# Patient Record
Sex: FEMALE | Race: AMERICAN INDIAN OR ALASKA NATIVE
[De-identification: names, ages, dates, MRNs, and addresses within clinical notes are randomized per-mention and may not be internally consistent; named-entity substitution may affect disease eponyms.]

---

## 2019-06-20 ENCOUNTER — HOSPITAL ENCOUNTER (OUTPATIENT)
Dept: HOSPITAL 5 - XRAY | Age: 54
Discharge: HOME | End: 2019-06-20
Attending: PAIN MEDICINE
Payer: MEDICAID

## 2019-06-20 DIAGNOSIS — M19.011: ICD-10-CM

## 2019-06-20 DIAGNOSIS — M19.012: Primary | ICD-10-CM

## 2019-06-20 DIAGNOSIS — I10: ICD-10-CM

## 2019-06-20 DIAGNOSIS — J45.909: ICD-10-CM

## 2019-06-20 NOTE — XRAY REPORT
PROCEDURE: XR SHOULDER BILAT 2+V 

 

TECHNIQUE:  Frontal and Y views both shoulders 

 

HISTORY: PAIN IN BILATERAL SHOULDERS 

 

COMPARISONS: None 

 

FINDINGS: 

Right shoulder: 

There is no evidence of fracture or subluxation. 

There is evidence of degenerative change of the glenohumeral joint with osteophyte formation. 

The soft tissues are unremarkable. 

Left shoulder: 

There is no evidence of fracture or subluxation. 

There is degenerative change of the glenohumeral joint with osteophyte formation. 

The soft tissues are unremarkable. 

 

IMPRESSION: 

1. Evidence of degenerative change of the glenohumeral joints bilaterally. 

If further imaging is required, MRI may be helpful. 

 

This document is electronically signed by Jocelin Soriano MD., June 20 2019 08:19:15 PM ET

## 2020-06-07 ENCOUNTER — HOSPITAL ENCOUNTER (OUTPATIENT)
Dept: HOSPITAL 5 - ED | Age: 55
Setting detail: OBSERVATION
LOS: 2 days | Discharge: HOME | End: 2020-06-09
Attending: INTERNAL MEDICINE | Admitting: INTERNAL MEDICINE
Payer: MEDICAID

## 2020-06-07 DIAGNOSIS — Z88.6: ICD-10-CM

## 2020-06-07 DIAGNOSIS — F17.200: ICD-10-CM

## 2020-06-07 DIAGNOSIS — I10: ICD-10-CM
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
## 2020-06-07 DIAGNOSIS — E87.6: ICD-10-CM

## 2020-06-07 DIAGNOSIS — M19.019: ICD-10-CM
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
## 2020-06-07 DIAGNOSIS — R41.82: ICD-10-CM

## 2020-06-07 DIAGNOSIS — G92: ICD-10-CM

## 2020-06-07 DIAGNOSIS — F31.9: ICD-10-CM

## 2020-06-07 DIAGNOSIS — M06.9: ICD-10-CM

## 2020-06-07 DIAGNOSIS — Z90.710: ICD-10-CM

## 2020-06-07 DIAGNOSIS — Z88.1: ICD-10-CM

## 2020-06-07 DIAGNOSIS — E66.9: ICD-10-CM
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
## 2020-06-07 DIAGNOSIS — Z79.899: ICD-10-CM

## 2020-06-07 DIAGNOSIS — E11.9: ICD-10-CM

## 2020-06-07 DIAGNOSIS — F41.1: ICD-10-CM

## 2020-06-07 DIAGNOSIS — T40.2X1A: Primary | ICD-10-CM

## 2020-06-07 LAB
ALBUMIN SERPL-MCNC: 3.3 G/DL (ref 3.9–5)
ALT SERPL-CCNC: 15 UNITS/L (ref 7–56)
BASOPHILS # (AUTO): 0.1 K/MM3 (ref 0–0.1)
BASOPHILS NFR BLD AUTO: 0.7 % (ref 0–1.8)
BUN SERPL-MCNC: 9 MG/DL (ref 7–17)
BUN/CREAT SERPL: 13 %
CALCIUM SERPL-MCNC: 9.1 MG/DL (ref 8.4–10.2)
EOSINOPHIL # BLD AUTO: 0.2 K/MM3 (ref 0–0.4)
EOSINOPHIL NFR BLD AUTO: 1.9 % (ref 0–4.3)
HCO3 BLDA-SCNC: 25.6 MMOL/L (ref 20–26)
HCT VFR BLD CALC: 32.1 % (ref 30.3–42.9)
HEMOLYSIS INDEX: 16
HGB BLD-MCNC: 11 GM/DL (ref 10.1–14.3)
LYMPHOCYTES # BLD AUTO: 2.1 K/MM3 (ref 1.2–5.4)
LYMPHOCYTES NFR BLD AUTO: 21.9 % (ref 13.4–35)
MCHC RBC AUTO-ENTMCNC: 34 % (ref 30–34)
MCV RBC AUTO: 88 FL (ref 79–97)
MONOCYTES # (AUTO): 1.1 K/MM3 (ref 0–0.8)
MONOCYTES % (AUTO): 11.7 % (ref 0–7.3)
PCO2 BLDA: 39.3 MM HG
PH BLDA: 7.43 PH UNITS (ref 7.35–7.45)
PLATELET # BLD: 284 K/MM3 (ref 140–440)
PO2 BLDA: 91.6 MM HG (ref 80–90)
RBC # BLD AUTO: 3.66 M/MM3 (ref 3.65–5.03)

## 2020-06-07 PROCEDURE — 93005 ELECTROCARDIOGRAM TRACING: CPT

## 2020-06-07 PROCEDURE — 71045 X-RAY EXAM CHEST 1 VIEW: CPT

## 2020-06-07 PROCEDURE — 96365 THER/PROPH/DIAG IV INF INIT: CPT

## 2020-06-07 PROCEDURE — G0378 HOSPITAL OBSERVATION PER HR: HCPCS

## 2020-06-07 PROCEDURE — G0480 DRUG TEST DEF 1-7 CLASSES: HCPCS

## 2020-06-07 PROCEDURE — 80307 DRUG TEST PRSMV CHEM ANLYZR: CPT

## 2020-06-07 PROCEDURE — 99291 CRITICAL CARE FIRST HOUR: CPT

## 2020-06-07 PROCEDURE — 85025 COMPLETE CBC W/AUTO DIFF WBC: CPT

## 2020-06-07 PROCEDURE — 82962 GLUCOSE BLOOD TEST: CPT

## 2020-06-07 PROCEDURE — 96372 THER/PROPH/DIAG INJ SC/IM: CPT

## 2020-06-07 PROCEDURE — 36415 COLL VENOUS BLD VENIPUNCTURE: CPT

## 2020-06-07 PROCEDURE — 80053 COMPREHEN METABOLIC PANEL: CPT

## 2020-06-07 PROCEDURE — 96361 HYDRATE IV INFUSION ADD-ON: CPT

## 2020-06-07 PROCEDURE — 80048 BASIC METABOLIC PNL TOTAL CA: CPT

## 2020-06-07 PROCEDURE — 85610 PROTHROMBIN TIME: CPT

## 2020-06-07 PROCEDURE — 82803 BLOOD GASES ANY COMBINATION: CPT

## 2020-06-07 PROCEDURE — 96375 TX/PRO/DX INJ NEW DRUG ADDON: CPT

## 2020-06-07 PROCEDURE — 80320 DRUG SCREEN QUANTALCOHOLS: CPT

## 2020-06-07 PROCEDURE — 81001 URINALYSIS AUTO W/SCOPE: CPT

## 2020-06-07 NOTE — EMERGENCY DEPARTMENT REPORT
History of Present Illness





- General


Stated Complaint: AMS


Time Seen by Provider: 20 22:00


Source: patient


Mode of arrival: Stretcher


Limitations: Altered Mental Status





- History of Present Illness


Initial Comments: 





Patient is a 55-year-old female that presents emergency room with complaints of 

accidental overdose.  Patient brought in by EMS.  EMS gave the patient Narcan 

prior to arrival.  Patient's initial assessment by EMS was that the patient had 

pinpoint pupils and was unresponsive.  Patient became more arousable after 

Narcan.  Patient answering questions.  Patient states that she is tired.  

Patient states that she was trying to get rid of her shoulder pain.  Patient 

states she has sublingual buprenorphine and she took extra because the pain was 

so severe.  Patient states she is awaiting to have a shoulder surgery.  Patient 

denies suicidal or homicidal ideations.  Patient denies depression.


MD Complaint: accidental overdose


-: Sudden


Context: Accidental Overdose: medication error


Treatments Prior to Arrival: narcan





- Related Data


                                Home Medications











 Medication  Instructions  Recorded  Confirmed  Last Taken


 


Buprenorphine HCl [Belbuca] 600 mcg BC Q12H 20 Unknown


 


DULoxetine [Cymbalta] 30 mg PO DAILY 20 Unknown


 


Dicyclomine [Bentyl] 20 mg PO QHS 20 Unknown


 


Penicillin Vk [Veetids TAB] 500 mg PO QID 20 Unknown


 


QUEtiapine [SEROquel] 200 mg PO QHS 20 Unknown


 


amLODIPine [Norvasc] 5 mg PO DAILY 20 Unknown


 


carBAMazepine [Carbamazepine] 200 mg PO QAM 20 Unknown


 


carBAMazepine [Carbamazepine] 400 mg PO QHS 20 Unknown


 


clonazePAM [Klonopin] 1 mg PO DAILY 20 Unknown











                                    Allergies











Allergy/AdvReac Type Severity Reaction Status Date / Time


 


ketorolac tromethamine Allergy  Rash Verified 05/28/15 14:53





[From Toradol]     


 


levofloxacin [From Levaquin] Allergy  Rash Verified 05/28/15 14:53














ED Review of Systems


ROS: 


Stated complaint: AMS


Other details as noted in HPI





Constitutional: denies: chills, fever


Eyes: denies: eye pain, eye discharge, vision change


ENT: denies: ear pain, throat pain


Respiratory: denies: cough, shortness of breath, wheezing


Cardiovascular: denies: chest pain, palpitations


Endocrine: no symptoms reported


Gastrointestinal: denies: abdominal pain, nausea, diarrhea


Genitourinary: denies: urgency, dysuria, discharge


Musculoskeletal: denies: back pain, joint swelling, arthralgia


Skin: denies: rash, lesions


Neurological: denies: headache, weakness, paresthesias


Psychiatric: denies: anxiety, depression


Hematological/Lymphatic: denies: easy bleeding, easy bruising





ED Past Medical Hx





- Past Medical History


Previous Medical History?: Yes


Hx Hypertension: Yes


Hx Diabetes: Yes


Hx Psychiatric Treatment: Yes (2 IP txs)


Hx Asthma: Yes (RA)


Additional medical history: unknown??





- Surgical History


Additional Surgical History: hysterectomy





- Family History


Family history: no significant





- Social History


Smoking Status: Current Every Day Smoker


Substance Use Type: None





- Medications


Home Medications: 


                                Home Medications











 Medication  Instructions  Recorded  Confirmed  Last Taken  Type


 


Buprenorphine HCl [Belbuca] 600 mcg BC Q12H 20 Unknown History


 


DULoxetine [Cymbalta] 30 mg PO DAILY 20 Unknown History


 


Dicyclomine [Bentyl] 20 mg PO QHS 20 Unknown History


 


Penicillin Vk [Veetids TAB] 500 mg PO QID 20 Unknown History


 


QUEtiapine [SEROquel] 200 mg PO QHS 20 Unknown History


 


amLODIPine [Norvasc] 5 mg PO DAILY 20 Unknown History


 


carBAMazepine [Carbamazepine] 200 mg PO QAM 20 Unknown History


 


carBAMazepine [Carbamazepine] 400 mg PO QHS 20 Unknown History


 


clonazePAM [Klonopin] 1 mg PO DAILY 20 Unknown History














ED Physical Exam





- General


General appearance: alert, in no apparent distress





- Head


Head exam: Present: atraumatic, normocephalic





- Eye


Eye exam: Present: normal appearance





- ENT


ENT exam: Present: mucous membranes moist





- Neck


Neck exam: Present: normal inspection





- Respiratory


Respiratory exam: Present: normal lung sounds bilaterally.  Absent: respiratory 

distress





- Cardiovascular


Cardiovascular Exam: Present: regular rate, normal rhythm.  Absent: systolic 

murmur, diastolic murmur, rubs, gallop





- GI/Abdominal


GI/Abdominal exam: Present: soft, normal bowel sounds





- Extremities Exam


Extremities exam: Present: normal inspection





- Back Exam


Back exam: Present: normal inspection





- Neurological Exam


Neurological exam: Present: alert, oriented X3





- Psychiatric


Psychiatric exam: Present: normal affect, normal mood





- Skin


Skin exam: Present: warm, dry, intact, normal color.  Absent: rash





ED Course


                                   Vital Signs











  20





  22:24 23:30 00:00


 


Temperature 98.2 F  


 


Pulse Rate 94 H 90 


 


Respiratory 20 20 20





Rate   


 


Blood Pressure 151/86 161/98 





[Left]   


 


O2 Sat by Pulse 84 99 95





Oximetry   














- Reevaluation(s)


Reevaluation #1: 


Initial evaluation done.  Patient found to be lethargic but arousable.  Patient 

answering questions appropriately.  Patient will be monitored.  Nurse to call 

poison control.


20 22:10





Reevaluation #2: 


Recommendation received from poison control.  See recommendations below placed 

on nurses note.


20 23:00





JENNAGEMMA MALGORZATA   Female : 1965  MedRec# [id number]





20 22:56 - Nurse Note by HUGH BROWN


   [id number]  : 1965  Patient Age: 55





2220-Spoke with poison control and they advise to give Narcan prn and watch for 

respiratory depression and Acute Lung Injury, Hypotension, Bradycardia, and 

Myosis. Order chest xray, electrolytes, ABG, Tylenol level, Aspirin level, drug 

screen, and alcohol level. If Narcan has to repeated may consider placing 

patient on a Narcan drip. Patient should be monitored ICU 24 hours and until 

patient has returned to baseline and no Narcan given for 6 hours.     





Initialized on 20 22:56 - END OF NOTE











Reevaluation #3: 


Patient is lethargic but easily arousable.  Patient answering questions ashutosh

ropriately.  Patient's O2 saturations stable.


20 23:30





Reevaluation #4: 


Patient still lethargic but arousable.


20 00:01





Reevaluation #5: 


I discussed all results with patient.  I discussed plan of care with patient.  

Patient agrees with plan of care and admission.  Patient to be admitted to the 

hospitalist service.


20 00:45








- Consultations


Consultation #1: 


Hospitalist consulted for admission.  Hospitalist to admit patient.


20 00:45








ED Medical Decision Making





- Lab Data


Result diagrams: 


                                 20 22:49





                                 20 22:49





- EKG Data


-: EKG Interpreted by Me


EKG shows normal: sinus rhythm, axis, intervals, QRS complexes, ST-T waves


Rate: normal





- Medical Decision Making





Patient is a 55-year-old female that presents emergency room with complaints of 

accidental overdose with buprenorphine.  Patient has buccal films and and per 

the patient prescription the patient picked up the prescription 7 days ago and 

takes 1 film twice a day.  Patient has only 7 left of a prescription that she 

obtained 7 days ago of 60 films.  Patient found to be lethargic but easily 

arousable and answering questions.  Patient given Narcan by EMS prior to 

arrival.  EMS states the patient was completely unresponsive with pinpoint 

pupils until they gave Narcan and the patient became arousable.  Poison control 

was consulted early on in the patient's ER stay.  Patient had labs done patient 

labs essentially unremarkable except for the patient's UDS.  Patient's admitted 

to the ICU per the recommendations of poison control.  Patient admitted to the 

hospitalist service.  





- Differential Diagnosis


Accidental overdose, lethargy, altered mental status, decreased responsiven


Critical Care Time: Yes


Critical care time in (mins) excluding proc time.: 35


Critical care attestation.: 


If time is entered above; I have spent that time in minutes in the direct care 

of this critically ill patient, excluding procedure time.





Critical Care Time: 





35 minutes





ED Disposition


Clinical Impression: 


 Hypokalemia, Decreased responsiveness





Overdose


Qualifiers:


 Encounter type: initial encounter Injury intent: accidental or unintentional 

Qualified Code(s): T50.901A - Poisoning by unspecified drugs, medicaments and 

biological substances, accidental (unintentional), initial encounter





Altered mental state


Qualifiers:


 Altered mental status type: unspecified Qualified Code(s): R41.82 - Altered 

mental status, unspecified





Disposition: -09 OP ADMIT IP TO THIS HOSP


Is pt being admited?: Yes


Does the pt Need Aspirin: No


Condition: Critical


Referrals: 


PRIMARY CARE,MD [Primary Care Provider] - 3-5 Days


Time of Disposition: 00:33

## 2020-06-08 LAB
BACTERIA #/AREA URNS HPF: (no result) /HPF
BENZODIAZEPINES SCREEN,URINE: (no result)
BILIRUB UR QL STRIP: (no result)
BLOOD UR QL VISUAL: (no result)
METHADONE SCREEN,URINE: (no result)
MUCOUS THREADS #/AREA URNS HPF: (no result) /HPF
OPIATE SCREEN,URINE: (no result)
PH UR STRIP: 6 [PH] (ref 5–7)
PROT UR STRIP-MCNC: (no result) MG/DL
RBC #/AREA URNS HPF: 10 /HPF (ref 0–6)
UROBILINOGEN UR-MCNC: < 2 MG/DL (ref ?–2)
WBC #/AREA URNS HPF: 1 /HPF (ref 0–6)

## 2020-06-08 RX ADMIN — OXYCODONE AND ACETAMINOPHEN PRN TAB: 5; 325 TABLET ORAL at 17:08

## 2020-06-08 RX ADMIN — HEPARIN SODIUM SCH: 5000 INJECTION, SOLUTION INTRAVENOUS; SUBCUTANEOUS at 21:44

## 2020-06-08 RX ADMIN — HEPARIN SODIUM SCH UNIT: 5000 INJECTION, SOLUTION INTRAVENOUS; SUBCUTANEOUS at 07:02

## 2020-06-08 RX ADMIN — INSULIN LISPRO SCH: 100 INJECTION, SOLUTION INTRAVENOUS; SUBCUTANEOUS at 09:12

## 2020-06-08 RX ADMIN — OXYCODONE AND ACETAMINOPHEN PRN TAB: 5; 325 TABLET ORAL at 23:06

## 2020-06-08 RX ADMIN — INSULIN LISPRO SCH: 100 INJECTION, SOLUTION INTRAVENOUS; SUBCUTANEOUS at 11:40

## 2020-06-08 RX ADMIN — HEPARIN SODIUM SCH UNIT: 5000 INJECTION, SOLUTION INTRAVENOUS; SUBCUTANEOUS at 13:05

## 2020-06-08 NOTE — PROGRESS NOTE
Assessment and Plan


Assessment and plan: 


--Accidental drug overdose;


Patient took pain medication accidentally


Denies suicidal ideation or suicidal thoughts


Status post Narcan, patient is alert awake oriented x3





--Acute toxic metabolic encephalopathy;


Secondary to drug overdose


Patient is more alert and awake responding appropriately


Closely monitor





--Depression/psych illness;


Patient on psych medications


Psych consult to evaluate the patient


And review patient's medications





--Shoulder pain/arthritis


Hold narcotic pain medications


Toradol as needed and supportive care





--Obesity; BMI 34.4;


Patient needs to reduce weight when medically stable


Advised diet modification exercise as tolerated





--Hypokalemia; replenish per protocol and monitor levels


Check magnesium





--Hypertension; moderate control


Add oral hydralazine and PRN IV hydralazine


Closely monitor





--DVT prophylaxis;


Lovenox





--Full CODE STATUS





Closely monitor the patient and adjust the management as needed


Plan of care reviewed with the patient and her nurse


Follow psych evaluation and recommendations


Possible discharge in 1 to 2 days if stable





Advance care 35 minutes































































































History


Interval history: 


Patient seen and examined at the bedside, patient's chart and med medications


Tests and reports reviewed


Patient was admitted with accidental drug overdose altered level of 

consciousness


Patient is more alert and awake today, says she is depressed of her shoulder 

pain


But denied suicidal thoughts, suicidal ideation or suicidal attempt


Vital signs reviewed





Hospitalist Physical





- Constitutional


Vitals: 


                                        











Temp Pulse Resp BP Pulse Ox


 


 98.4 F   72   17   135/85   100 


 


 06/08/20 09:15  06/08/20 10:16  06/08/20 08:00  06/08/20 10:16  06/08/20 08:00











General appearance: Present: mild distress, well-nourished, obese





- EENT


Eyes: Present: PERRL, EOM intact





- Neck


Neck: Present: supple, normal ROM





- Respiratory


Respiratory effort: normal


Respiratory: bilateral: diminished, negative: rales, rhonchi, wheezing





- Cardiovascular


Rhythm: regular


Heart Sounds: Present: S1 & S2





- Extremities


Extremities: no ischemia, No edema





- Abdominal


General gastrointestinal: soft, non-tender, non-distended, normal bowel sounds





- Integumentary


Integumentary: Present: clear, warm





- Psychiatric


Psychiatric: appropriate mood/affect, cooperative





- Neurologic


Neurologic: moves all extremities





Results





- Labs


CBC & Chem 7: 


                                 06/07/20 22:49





                                 06/07/20 22:49


Labs: 


                             Laboratory Last Values











WBC  9.5 K/mm3 (4.5-11.0)   06/07/20  22:49    


 


RBC  3.66 M/mm3 (3.65-5.03)   06/07/20  22:49    


 


Hgb  11.0 gm/dl (10.1-14.3)   06/07/20  22:49    


 


Hct  32.1 % (30.3-42.9)   06/07/20  22:49    


 


MCV  88 fl (79-97)   06/07/20  22:49    


 


MCH  30 pg (28-32)   06/07/20  22:49    


 


MCHC  34 % (30-34)   06/07/20  22:49    


 


RDW  14.6 % (13.2-15.2)   06/07/20  22:49    


 


Plt Count  284 K/mm3 (140-440)   06/07/20  22:49    


 


Lymph % (Auto)  21.9 % (13.4-35.0)   06/07/20  22:49    


 


Mono % (Auto)  11.7 % (0.0-7.3)  H  06/07/20  22:49    


 


Eos % (Auto)  1.9 % (0.0-4.3)   06/07/20  22:49    


 


Baso % (Auto)  0.7 % (0.0-1.8)   06/07/20  22:49    


 


Lymph #  2.1 K/mm3 (1.2-5.4)   06/07/20  22:49    


 


Mono #  1.1 K/mm3 (0.0-0.8)  H  06/07/20  22:49    


 


Eos #  0.2 K/mm3 (0.0-0.4)   06/07/20  22:49    


 


Baso #  0.1 K/mm3 (0.0-0.1)   06/07/20  22:49    


 


Seg Neutrophils %  63.8 % (40.0-70.0)   06/07/20  22:49    


 


Seg Neutrophils #  6.0 K/mm3 (1.8-7.7)   06/07/20  22:49    


 


ABG pH  7.432 pH Units (7.350-7.450)   06/07/20  23:23    


 


ABG pCO2  39.3 mm Hg  06/07/20  23:23    


 


ABG pO2  91.6 mm Hg (80.0-90.0)  H  06/07/20  23:23    


 


ABG HCO3  25.6 mmol/L (20.0-26.0)   06/07/20  23:23    


 


ABG O2 Saturation  97.3 % (95.0-99.0)   06/07/20  23:23    


 


ABG O2 Content  13.5  (0.0-44)   06/07/20  23:23    


 


ABG Base Excess  1.3 mmol/L (-2.0-3.0)   06/07/20  23:23    


 


ABG Hemoglobin  10.0 gm/dl (12.0-16.0)  L  06/07/20  23:23    


 


ABG Carboxyhemoglobin  1.9 % (0.0-5.0)   06/07/20  23:23    


 


ABG Methemoglobin  0.7 % (0.0-1.5)   06/07/20  23:23    


 


Oxyhemoglobin  94.9 % (95.0-99.0)  L  06/07/20  23:23    


 


FiO2  28 %  06/07/20  23:23    


 


Sodium  141 mmol/L (137-145)   06/07/20  22:49    


 


Potassium  3.2 mmol/L (3.6-5.0)  L  06/07/20  22:49    


 


Chloride  103.0 mmol/L ()   06/07/20  22:49    


 


Carbon Dioxide  23 mmol/L (22-30)   06/07/20  22:49    


 


Anion Gap  18 mmol/L  06/07/20  22:49    


 


BUN  9 mg/dL (7-17)   06/07/20  22:49    


 


Creatinine  0.7 mg/dL (0.7-1.2)   06/07/20  22:49    


 


Estimated GFR  > 60 ml/min  06/07/20  22:49    


 


BUN/Creatinine Ratio  13 %  06/07/20  22:49    


 


Glucose  144 mg/dL ()  H  06/07/20  22:49    


 


Calcium  9.1 mg/dL (8.4-10.2)   06/07/20  22:49    


 


Total Bilirubin  0.30 mg/dL (0.1-1.2)   06/07/20  22:49    


 


AST  13 units/L (5-40)   06/07/20  22:49    


 


ALT  15 units/L (7-56)   06/07/20  22:49    


 


Alkaline Phosphatase  115 units/L ()   06/07/20  22:49    


 


Total Protein  7.2 g/dL (6.3-8.2)   06/07/20  22:49    


 


Albumin  3.3 g/dL (3.9-5)  L  06/07/20  22:49    


 


Albumin/Globulin Ratio  0.8 %  06/07/20  22:49    


 


Urine Color  Yellow  (Yellow)   06/07/20  23:53    


 


Urine Turbidity  Clear  (Clear)   06/07/20  23:53    


 


Urine pH  6.0  (5.0-7.0)   06/07/20  23:53    


 


Ur Specific Gravity  1.015  (1.003-1.030)   06/07/20  23:53    


 


Urine Protein  <15 mg/dl mg/dL (Negative)   06/07/20  23:53    


 


Urine Glucose (UA)  Neg mg/dL (Negative)   06/07/20  23:53    


 


Urine Ketones  Neg mg/dL (Negative)   06/07/20  23:53    


 


Urine Blood  Sm  (Negative)   06/07/20  23:53    


 


Urine Nitrite  Neg  (Negative)   06/07/20  23:53    


 


Urine Bilirubin  Neg  (Negative)   06/07/20  23:53    


 


Urine Urobilinogen  < 2.0 mg/dL (<2.0)   06/07/20  23:53    


 


Ur Leukocyte Esterase  Neg  (Negative)   06/07/20  23:53    


 


Urine WBC (Auto)  1.0 /HPF (0.0-6.0)   06/07/20  23:53    


 


Urine RBC (Auto)  10.0 /HPF (0.0-6.0)   06/07/20  23:53    


 


U Epithel Cells (Auto)  1.0 /HPF (0-13.0)   06/07/20  23:53    


 


Urine Bacteria (Auto)  1+ /HPF (Negative)   06/07/20  23:53    


 


Urine Mucus  Few /HPF  06/07/20  23:53    


 


Salicylates  < 0.3 mg/dL (2.8-20.0)  L  06/07/20  22:49    


 


Urine Opiates Screen  Presumptive negative   06/07/20  23:53    


 


Urine Methadone Screen  Presumptive negative   06/07/20  23:53    


 


Acetaminophen  < 5.0 ug/mL (10.0-30.0)  L  06/07/20  22:49    


 


Ur Barbiturates Screen  Presumptive positive   06/07/20  23:53    


 


Ur Phencyclidine Scrn  Presumptive negative   06/07/20  23:53    


 


Ur Amphetamines Screen  Presumptive negative   06/07/20  23:53    


 


U Benzodiazepines Scrn  Presumptive positive   06/07/20  23:53    


 


Urine Cocaine Screen  Presumptive negative   06/07/20  23:53    


 


U Marijuana (THC) Screen  Presumptive negative   06/07/20  23:53    


 


Drugs of Abuse Note  Disclamer   06/07/20  23:53    


 


Plasma/Serum Alcohol  < 0.01 % (0-0.07)   06/07/20  22:49    











Trejo/IV: 


                                        





IV Catheter Type [Right          INT / Saline Lock


Antecubital]                     











Active Medications





- Current Medications


Current Medications: 














Generic Name Dose Route Start Last Admin





  Trade Name Freq  PRN Reason Stop Dose Admin


 


Amlodipine Besylate  5 mg  06/08/20 10:00  06/08/20 10:16





  Amlodipine  PO   5 mg





  DAILY JENNIFER   Administration


 


Dextrose  50 ml  06/08/20 01:04 





  D50w (25gm) Syringe  IV  





  Q30MIN PRN  





  Hypoglycemia  





  Protocol  


 


Heparin Sodium (Porcine)  5,000 unit  06/08/20 06:00  06/08/20 07:02





  Heparin  SUB-Q   5,000 unit





  Q8HR JENNIFER   Administration


 


Hydralazine HCl  10 mg  06/08/20 01:16 





  Apresoline  IV  





  Q6HR PRN  





  Blood Pressure  


 


Sodium Chloride  1,000 mls @ 250 mls/hr  06/08/20 01:45 





  Nacl 0.9% 1000 Ml  IV  





  AS DIRECT JENNIFER  


 


Insulin Human Lispro  0 unit  06/08/20 07:30  06/08/20 09:12





  Humalog  SUB-Q   Not Given





  [facility]  





  Protocol  


 


Magnesium Hydroxide  30 ml  06/08/20 01:00 





  Milk Of Magnesia  PO  





  Q4H PRN  





  Constipation  


 


Naloxone HCl  0.1 mg  06/07/20 22:09  06/07/20 22:50





  Naloxone  IV   0.1 mg





  Q2MIN PRN   Administration





  Res Rate </= 8 or 02 SAT < 92%  


 


Ondansetron HCl  4 mg  06/08/20 01:00 





  Zofran  IV  





  Q8H PRN  





  Nausea And Vomiting  


 


Quetiapine Fumarate  200 mg  06/08/20 22:00 





  Seroquel  PO  





  [facility] JENNIFER  


 


Sodium Chloride  10 ml  06/08/20 10:00  06/08/20 10:17





  Sodium Chloride Flush Syringe 10 Ml  IV   10 ml





  BID JENNIFER   Administration


 


Sodium Chloride  10 ml  06/08/20 01:00 





  Sodium Chloride Flush Syringe 10 Ml  IV  





  PRN PRN  





  LINE FLUSH  














Nutrition/Malnutrition Assess





- Dietary Evaluation


Nutrition/Malnutrition Findings: 


                                        





Nutrition Notes                                            Start:  06/08/20 

11:00


Freq:                                                      Status: Active       




Protocol:                                                                       




 Document     06/08/20 11:00  LP  (Rec: 06/08/20 11:02  LP  [id number])


 Nutrition Notes


     Need for Assessment generated from:         MD Order


     Initial or Follow up                        Brief Note


     Current Diagnosis                           Diabetes,Hypertension


     Other Pertinent Diagnosis                   Asthma, OD


     Current Diet                                Cardiac/consistent CHO


     Weight Status                               Obese


     Subjective/Other Information                Consult for diet education. Pt


                                                 sleeping at time of visit.


 Nutrition Intervention


     Follow-Up By:                               06/09/20


     Additional Comments                         Follow for diet education

## 2020-06-08 NOTE — HISTORY AND PHYSICAL REPORT
History of Present Illness


Date of examination: 06/08/20


Date of admission: 


06/08/2020


Chief complaint: 





Accidental overdose


History of present illness: 





57-year-old -American female brought in by EMS to the emergency room 

today with complaints of accidental overdose.  Initial assessment by EMS was 

that patient had pinpoint pupils and was unresponsive.  She was given some 

Narcan prior to arrival in the emergency room.  She became more responsive and 

was answering questions.  She indicates that she is just tired.  She was 

prescribed some sublingual buprenorphine for shoulder pain and admits that she 

took extra doses in order to take care of the shoulder pain.  She currently 

awaits shoulder surgery.





She denies homicidal or suicidal ideations.  She denies having depression.





Her work-up in the emergency room was unremarkable except for mild hypokalemia. 

However recommendation by Poison control is to monitor patient in the intensive 

care unit.  Other recommendations were to give Narcan as needed.





Past History


Past Medical History: diabetes, hypertension, other (Rheumatoid arthritis)


Past Surgical History: hysterectomy


Social history: smoking (Current every day smoker).  denies: alcohol abuse


Family history: no significant family history





Medications and Allergies


                                    Allergies











Allergy/AdvReac Type Severity Reaction Status Date / Time


 


ketorolac tromethamine Allergy  Rash Verified 05/28/15 14:53





[From Toradol]     


 


levofloxacin [From Levaquin] Allergy  Rash Verified 05/28/15 14:53











                                Home Medications











 Medication  Instructions  Recorded  Confirmed  Last Taken  Type


 


Buprenorphine HCl [Belbuca] 600 mcg BC Q12H 06/07/20 06/07/20 Unknown History


 


DULoxetine [Cymbalta] 30 mg PO DAILY 06/07/20 06/07/20 Unknown History


 


Dicyclomine [Bentyl] 20 mg PO QHS 06/07/20 06/07/20 Unknown History


 


Penicillin Vk [Veetids TAB] 500 mg PO QID 06/07/20 06/07/20 Unknown History


 


QUEtiapine [SEROquel] 200 mg PO QHS 06/07/20 06/07/20 Unknown History


 


amLODIPine [Norvasc] 5 mg PO DAILY 06/07/20 06/07/20 Unknown History


 


carBAMazepine [Carbamazepine] 200 mg PO QAM 06/07/20 06/07/20 Unknown History


 


carBAMazepine [Carbamazepine] 400 mg PO QHS 06/07/20 06/07/20 Unknown History


 


clonazePAM [Klonopin] 1 mg PO DAILY 06/07/20 06/07/20 Unknown History











Active Meds: 


Active Medications





Dextrose (D50w (25gm) Syringe)  50 ml IV Q30MIN PRN; Protocol


   PRN Reason: Hypoglycemia


Potassium Chloride (Kcl 10meq/100ml)  10 meq in 100 mls @ 100 mls/hr IV ONCE ONE


   Stop: 06/08/20 02:04


Insulin Human Lispro (Humalog)  0 unit SUB-Q ACHS [facility]ENN[facility]; Protocol


Magnesium Hydroxide (Milk Of Magnesia)  30 ml PO Q4H PRN


   PRN Reason: Constipation


Naloxone HCl (Naloxone)  0.1 mg IV Q2MIN PRN


   PRN Reason: Res Rate </= 8 or 02 SAT < 92%


   Last Admin: 06/07/20 22:50 Dose:  0.1 mg


   Documented by: 


Ondansetron HCl (Zofran)  4 mg IV Q8H PRN


   PRN Reason: Nausea And Vomiting


Sodium Chloride (Sodium Chloride Flush Syringe 10 Ml)  10 ml IV BID [facility]ENN[facility]


Sodium Chloride (Sodium Chloride Flush Syringe 10 Ml)  10 ml IV PRN PRN


   PRN Reason: LINE FLUSH











Review of Systems


Constitutional: no fever, no chills, no fatigue


Ears, nose, mouth and throat: no nasal congestion, no sore throat


Cardiovascular: no chest pain, no palpitations


Respiratory: no cough, no cough with sputum, no shortness of breath


Gastrointestinal: no abdominal pain, no nausea, no vomiting, no diarrhea


Genitourinary Female: no flank pain, no dysuria, no urinary frequency, no 

hematuria


Rectal: no pain, no itching


Musculoskeletal: no neck pain, no low back pain


Integumentary: no rash, no pruritis


Neurological: no headaches, no confusion


Psychiatric: no anxiety


Endocrine: no cold intolerance, no excessive thirst, no polydipsia, no polyuria,

 no nocturia





Exam





- Constitutional


Vitals: 


                                        











Temp Pulse Resp BP Pulse Ox


 


 98.2 F   84   20   144/83   100 


 


 06/07/20 22:24  06/08/20 00:45  06/08/20 00:45  06/08/20 00:45  06/08/20 00:45











General appearance: Present: no acute distress, well-nourished, obese





- EENT


Eyes: Present: PERRL, EOM intact


ENT: hearing intact, clear oral mucosa, dentition normal





- Neck


Neck: Present: supple, normal ROM





- Respiratory


Respiratory effort: normal


Respiratory: bilateral: CTA





- Cardiovascular


Rhythm: regular


Heart Sounds: Present: S1 & S2





- Extremities


Extremities: no ischemia, pulses intact, pulses symmetrical, No edema, Full ROM





- Abdominal


General gastrointestinal: Present: soft, non-tender, non-distended, normal bowel

 sounds





Results





- Labs


CBC & Chem 7: 


                                 06/07/20 22:49





                                 06/07/20 22:49


Labs: 


                              Abnormal lab results











  06/07/20 06/07/20 06/07/20 Range/Units





  22:49 22:49 22:49 


 


Mono % (Auto)  11.7 H    (0.0-7.3)  %


 


Mono #  1.1 H    (0.0-0.8)  K/mm3


 


ABG pO2     (80.0-90.0)  mm Hg


 


ABG Hemoglobin     (12.0-16.0)  gm/dl


 


Oxyhemoglobin     (95.0-99.0)  %


 


Potassium   3.2 L   (3.6-5.0)  mmol/L


 


Glucose   144 H   ()  mg/dL


 


Albumin   3.3 L   (3.9-5)  g/dL


 


Salicylates    < 0.3 L  (2.8-20.0)  mg/dL


 


Acetaminophen     (10.0-30.0)  ug/mL














  06/07/20 06/07/20 Range/Units





  22:49 23:23 


 


Mono % (Auto)    (0.0-7.3)  %


 


Mono #    (0.0-0.8)  K/mm3


 


ABG pO2   91.6 H  (80.0-90.0)  mm Hg


 


ABG Hemoglobin   10.0 L  (12.0-16.0)  gm/dl


 


Oxyhemoglobin   94.9 L  (95.0-99.0)  %


 


Potassium    (3.6-5.0)  mmol/L


 


Glucose    ()  mg/dL


 


Albumin    (3.9-5)  g/dL


 


Salicylates    (2.8-20.0)  mg/dL


 


Acetaminophen  < 5.0 L   (10.0-30.0)  ug/mL














Assessment and Plan





- Patient Problems


(1) Overdose


Current Visit: Yes   Status: Acute   


Qualifiers: 


   Encounter type: initial encounter   Injury intent: accidental or 

unintentional   Qualified Code(s): T50.901A - Poisoning by unspecified drugs, 

medicaments and biological substances, accidental (unintentional), initial 

encounter   


Plan to address problem: 


Patient has had accidental overdose of buprenorphine for shoulder pain.  Will 

monitor closely in the intensive care unit.


We also place a consult to intensivist for further evaluation and 

recommendation.








(2) Altered mental state


Current Visit: Yes   Status: Acute   


Qualifiers: 


   Altered mental status type: unspecified   Qualified Code(s): R41.82 - Altered

 mental status, unspecified   


Plan to address problem: 


Possibly secondary to the accidental overdose.  Will monitor mental status.








(3) Hypokalemia


Current Visit: Yes   Status: Acute   


Plan to address problem: 


Potassium will be repleted and will monitor chemistry.








(4) Hypertension


Current Visit: Yes   Status: Acute   


Plan to address problem: 


We will resume routine home medications once reconciled.  Will monitor vital 

signs closely.








(5) Diabetes mellitus


Current Visit: Yes   Status: Acute   


Plan to address problem: 


We will monitor Accu-Cheks closely.








(6) DVT prophylaxis


Current Visit: Yes   Status: Acute   


Plan to address problem: 


Patient placed on subcutaneous heparin.








(7) Full code status


Current Visit: Yes   Status: Acute

## 2020-06-08 NOTE — CONSULTATION
History of Present Illness





- Reason for Consult


Consult date: 06/08/20


Accidental Overdose


Requesting physician: SILVANO MILLER III





- History of Present Illness





54 y/o female, who suffers from Migraine headaches and severe shoulder pain 

admitted with altered mental state and concern for drug overdose, unintentional.

 Patient was unresponsive in ED but breathing.  Was given narcan and had some 

improvement in mental state.  ED and IMS apparently spoke with poison control 

and recommended monitoring in ICU.  Overnight vitals stable.  Patient awakens to

name call.  VSS and she is able to give somewhat of a history.  Apparently she 

was found on the floor by her boyfriend.  She does not feel she took too much 

medicine but her medications are very strong and she is on multiple meds that 

could be sedating.  Her UDS was positive for benzos and barbiturates but not 

opiates.  She did take an over the counter medicine for a migraine on yesterday.

 She says fiorcet is what she took.  Remainder of the review is positive for 

HTN.





Past History


Past Medical History: diabetes, hypertension, other (Rheumatoid arthritis)


Past Surgical History: hysterectomy


Social history: smoking (Current every day smoker).  denies: alcohol abuse


Family history: no significant family history





Medications and Allergies


                                    Allergies











Allergy/AdvReac Type Severity Reaction Status Date / Time


 


ketorolac tromethamine Allergy  Rash Verified 05/28/15 14:53





[From Toradol]     


 


levofloxacin [From Levaquin] Allergy  Rash Verified 05/28/15 14:53











                                Home Medications











 Medication  Instructions  Recorded  Confirmed  Last Taken  Type


 


Buprenorphine HCl [Belbuca] 600 mcg BC Q12H 06/07/20 06/07/20 Unknown History


 


DULoxetine [Cymbalta] 30 mg PO DAILY 06/07/20 06/07/20 Unknown History


 


Dicyclomine [Bentyl] 20 mg PO QHS 06/07/20 06/07/20 Unknown History


 


Penicillin Vk [Veetids TAB] 500 mg PO QID 06/07/20 06/07/20 Unknown History


 


QUEtiapine [SEROquel] 200 mg PO QHS 06/07/20 06/07/20 Unknown History


 


amLODIPine [Norvasc] 5 mg PO DAILY 06/07/20 06/07/20 Unknown History


 


carBAMazepine [Carbamazepine] 200 mg PO QAM 06/07/20 06/07/20 Unknown History


 


carBAMazepine [Carbamazepine] 400 mg PO QHS 06/07/20 06/07/20 Unknown History


 


clonazePAM [Klonopin] 1 mg PO DAILY 06/07/20 06/07/20 Unknown History











Active Meds: 


Active Medications





Amlodipine Besylate (Amlodipine)  5 mg PO DAILY [facility]


   Last Admin: 06/08/20 10:16 Dose:  5 mg


   Documented by: 


Dextrose (D50w (25gm) Syringe)  50 ml IV Q30MIN PRN; Protocol


   PRN Reason: Hypoglycemia


Heparin Sodium (Porcine) (Heparin)  5,000 unit SUB-Q Q8HR [facility]


   Last Admin: 06/08/20 07:02 Dose:  5,000 unit


   Documented by: 


Hydralazine HCl (Apresoline)  10 mg IV Q6HR PRN


   PRN Reason: Blood Pressure


Sodium Chloride (Nacl 0.9% 1000 Ml)  1,000 mls @ 250 mls/hr IV AS DIRECT [facility]


Insulin Human Lispro (Humalog)  0 unit SUB-Q ACHS [facility]; Protocol


   Last Admin: 06/08/20 09:12 Dose:  Not Given


   Documented by: 


Magnesium Hydroxide (Milk Of Magnesia)  30 ml PO Q4H PRN


   PRN Reason: Constipation


Naloxone HCl (Naloxone)  0.1 mg IV Q2MIN PRN


   PRN Reason: Res Rate </= 8 or 02 SAT < 92%


   Last Admin: 06/07/20 22:50 Dose:  0.1 mg


   Documented by: 


Ondansetron HCl (Zofran)  4 mg IV Q8H PRN


   PRN Reason: Nausea And Vomiting


Quetiapine Fumarate (Seroquel)  200 mg PO QHS [facility]


Sodium Chloride (Sodium Chloride Flush Syringe 10 Ml)  10 ml IV BID [facility]


   Last Admin: 06/08/20 10:17 Dose:  10 ml


   Documented by: 


Sodium Chloride (Sodium Chloride Flush Syringe 10 Ml)  10 ml IV PRN PRN


   PRN Reason: LINE FLUSH











Review of Systems


All systems: negative





Exam





- Constitutional


Vitals: 


                                        











Temp Pulse Resp BP Pulse Ox


 


 98.4 F   72   17   135/85   100 


 


 06/08/20 09:15  06/08/20 10:16  06/08/20 08:00  06/08/20 10:16  06/08/20 08:00











General appearance: Present: no acute distress, well-nourished, obese





- EENT


Eyes: Present: PERRL, EOM intact


ENT: hearing intact





- Neck


Neck: Present: supple, normal ROM





- Respiratory


Respiratory effort: normal


Respiratory: bilateral: CTA





- Cardiovascular


Rhythm: regular


Heart Sounds: Present: S1 & S2





- Extremities


Extremities: no ischemia





- Abdominal


General gastrointestinal: Present: soft, non-tender


Female genitourinary: Present: deferred





- Rectal


Rectal Exam: deferred





- Neurologic


Neurologic: CNII-XII intact





Results





- Labs


CBC & Chem 7: 


                                 06/07/20 22:49





                                 06/07/20 22:49


Labs: 


                              Abnormal lab results











  06/07/20 06/07/20 06/07/20 Range/Units





  22:49 22:49 22:49 


 


Mono % (Auto)  11.7 H    (0.0-7.3)  %


 


Mono #  1.1 H    (0.0-0.8)  K/mm3


 


ABG pO2     (80.0-90.0)  mm Hg


 


ABG Hemoglobin     (12.0-16.0)  gm/dl


 


Oxyhemoglobin     (95.0-99.0)  %


 


Potassium   3.2 L   (3.6-5.0)  mmol/L


 


Glucose   144 H   ()  mg/dL


 


Albumin   3.3 L   (3.9-5)  g/dL


 


Salicylates    < 0.3 L  (2.8-20.0)  mg/dL


 


Acetaminophen     (10.0-30.0)  ug/mL














  06/07/20 06/07/20 Range/Units





  22:49 23:23 


 


Mono % (Auto)    (0.0-7.3)  %


 


Mono #    (0.0-0.8)  K/mm3


 


ABG pO2   91.6 H  (80.0-90.0)  mm Hg


 


ABG Hemoglobin   10.0 L  (12.0-16.0)  gm/dl


 


Oxyhemoglobin   94.9 L  (95.0-99.0)  %


 


Potassium    (3.6-5.0)  mmol/L


 


Glucose    ()  mg/dL


 


Albumin    (3.9-5)  g/dL


 


Salicylates    (2.8-20.0)  mg/dL


 


Acetaminophen  < 5.0 L   (10.0-30.0)  ug/mL














- Imaging and Cardiology


Chest x-ray: image reviewed





Assessment and Plan





54 y/o obese female admitted with possible accidental overdose





1.  Stable cardiac and pulm status


2.  Needs either psych or neurology consult or both, to review med list.  

Suspect that patient's home regimen contributed to this episode more than the 

pain medication she is on.


3.  Wean FiO2 to off


4.  Stable for transfer to med/surge floor with remote tele.

## 2020-06-08 NOTE — XRAY REPORT
CHEST 1 VIEW 6/8/2020 1:53 AM



INDICATION / CLINICAL INFORMATION:

Overdose/lung injury.



COMPARISON: 

None available.



FINDINGS:



SUPPORT DEVICES: None.

HEART / MEDIASTINUM: No significant abnormality. 

LUNGS / PLEURA: There are bibasilar airspace opacities. The upper lungs are clear. No significant ple
ural effusion. No pneumothorax. 



ADDITIONAL FINDINGS: No significant additional findings.



IMPRESSION:

Bibasilar opacities could represent atelectasis or pneumonia secondary to aspiration. Please correlat
e with the clinical findings.



Signer Name: Bruce Bennett MD 

Signed: 6/8/2020 2:13 AM

Workstation Name: [id number]

## 2020-06-09 VITALS — DIASTOLIC BLOOD PRESSURE: 80 MMHG | SYSTOLIC BLOOD PRESSURE: 151 MMHG

## 2020-06-09 LAB
BASOPHILS # (AUTO): 0 K/MM3 (ref 0–0.1)
BASOPHILS NFR BLD AUTO: 0.7 % (ref 0–1.8)
BUN SERPL-MCNC: 7 MG/DL (ref 7–17)
BUN/CREAT SERPL: 12 %
CALCIUM SERPL-MCNC: 8.2 MG/DL (ref 8.4–10.2)
EOSINOPHIL # BLD AUTO: 0.2 K/MM3 (ref 0–0.4)
EOSINOPHIL NFR BLD AUTO: 3 % (ref 0–4.3)
HCT VFR BLD CALC: 30.5 % (ref 30.3–42.9)
HEMOLYSIS INDEX: 0
HGB BLD-MCNC: 10.4 GM/DL (ref 10.1–14.3)
INR PPP: 1.03 (ref 0.87–1.13)
LYMPHOCYTES # BLD AUTO: 2.2 K/MM3 (ref 1.2–5.4)
LYMPHOCYTES NFR BLD AUTO: 35.5 % (ref 13.4–35)
MCHC RBC AUTO-ENTMCNC: 34 % (ref 30–34)
MCV RBC AUTO: 88 FL (ref 79–97)
MONOCYTES # (AUTO): 0.5 K/MM3 (ref 0–0.8)
MONOCYTES % (AUTO): 8 % (ref 0–7.3)
PLATELET # BLD: 262 K/MM3 (ref 140–440)
RBC # BLD AUTO: 3.49 M/MM3 (ref 3.65–5.03)

## 2020-06-09 RX ADMIN — OXYCODONE AND ACETAMINOPHEN PRN TAB: 5; 325 TABLET ORAL at 05:28

## 2020-06-09 RX ADMIN — HEPARIN SODIUM SCH: 5000 INJECTION, SOLUTION INTRAVENOUS; SUBCUTANEOUS at 05:29

## 2020-06-09 NOTE — PROGRESS NOTE
Assessment and Plan





54 y/o obese female admitted with possible accidental overdose





1.  Stable cardiac and pulm status


2.  Needs either psych or neurology consult or both, to review med list.  

Suspect that patient's home regimen contributed to this episode more than the 

pain medication she is on.


3.  Wean FiO2 to off


4.  Will sign off, call if questions.





Subjective


Date of service: 06/09/20


Interval history: 





No acute events.  Vitals remain stable





Objective





- Constitutional


Vitals: 


                               Vital Signs - 12hr











  06/08/20 06/08/20 06/09/20





  21:42 23:59 05:20


 


Temperature  98.3 F 98.0 F


 


Pulse Rate 102 H 97 H 78


 


Respiratory  18 18





Rate   


 


Blood Pressure 157/81 137/71 133/73


 


O2 Sat by Pulse  100 94





Oximetry   














  06/09/20





  05:28


 


Temperature 


 


Pulse Rate 78


 


Respiratory 





Rate 


 


Blood Pressure 133/73


 


O2 Sat by Pulse 





Oximetry 














- Labs


CBC & Chem 7: 


                                 06/09/20 05:00





                                 06/09/20 05:00


Labs: 


                              Abnormal lab results











  06/08/20 06/08/20 06/09/20 Range/Units





  11:59 16:16 05:00 


 


RBC    3.49 L  (3.65-5.03)  M/mm3


 


Lymph % (Auto)    35.5 H  (13.4-35.0)  %


 


Mono % (Auto)    8.0 H  (0.0-7.3)  %


 


Potassium     (3.6-5.0)  mmol/L


 


Creatinine     (0.7-1.2)  mg/dL


 


Glucose     ()  mg/dL


 


POC Glucose  106 H  121 H   ()  


 


Calcium     (8.4-10.2)  mg/dL














  06/09/20 Range/Units





  05:00 


 


RBC   (3.65-5.03)  M/mm3


 


Lymph % (Auto)   (13.4-35.0)  %


 


Mono % (Auto)   (0.0-7.3)  %


 


Potassium  3.1 L  (3.6-5.0)  mmol/L


 


Creatinine  0.6 L  (0.7-1.2)  mg/dL


 


Glucose  104 H  ()  mg/dL


 


POC Glucose   ()  


 


Calcium  8.2 L  (8.4-10.2)  mg/dL














Medications & Allergies





- Medications


Allergies/Adverse Reactions: 


                                    Allergies





ketorolac tromethamine [From Toradol] Allergy (Verified 05/28/15 14:53)


   Rash


levofloxacin [From Levaquin] Allergy (Verified 05/28/15 14:53)


   Rash








Home Medications: 


                                Home Medications











 Medication  Instructions  Recorded  Confirmed  Last Taken  Type


 


Buprenorphine HCl [Belbuca] 600 mcg BC Q12H 06/07/20 06/07/20 Unknown History


 


DULoxetine [Cymbalta] 30 mg PO DAILY 06/07/20 06/07/20 Unknown History


 


Dicyclomine [Bentyl] 20 mg PO QHS 06/07/20 06/07/20 Unknown History


 


Penicillin Vk [Veetids TAB] 500 mg PO QID 06/07/20 06/07/20 Unknown History


 


QUEtiapine [SEROquel] 200 mg PO QHS 06/07/20 06/07/20 Unknown History


 


amLODIPine [Norvasc] 5 mg PO DAILY 06/07/20 06/07/20 Unknown History


 


carBAMazepine [Carbamazepine] 200 mg PO QAM 06/07/20 06/07/20 Unknown History


 


carBAMazepine [Carbamazepine] 400 mg PO QHS 06/07/20 06/07/20 Unknown History


 


clonazePAM [Klonopin] 1 mg PO DAILY 06/07/20 06/07/20 Unknown History











Active Medications: 














Generic Name Dose Route Start Last Admin





  Trade Name Freq  PRN Reason Stop Dose Admin


 


Amlodipine Besylate  5 mg  06/08/20 10:00  06/08/20 10:16





  Amlodipine  PO   5 mg





  DAILY JENNIFER   Administration


 


Dextrose  50 ml  06/08/20 01:04 





  D50w (25gm) Syringe  IV  





  Q30MIN PRN  





  Hypoglycemia  





  Protocol  


 


Heparin Sodium (Porcine)  5,000 unit  06/08/20 06:00  06/09/20 05:29





  Heparin  SUB-Q   Not Given





  Q8HR JENNIFER  


 


Hydralazine HCl  10 mg  06/08/20 01:16  06/08/20 17:10





  Apresoline  IV   10 mg





  Q6HR PRN   Administration





  Blood Pressure  


 


Hydralazine HCl  25 mg  06/08/20 22:00  06/09/20 05:28





  Apresoline  PO   25 mg





  Q8HR JENNIFER   Administration


 


Nicotine  21 mg  06/08/20 22:00  06/08/20 22:03





  Habitrol  TD   21 mg





  QDAY@2200 JENNIFER   Administration


 


Oxycodone/Acetaminophen  1 tab  06/08/20 16:57  06/09/20 05:28





  Percocet 5/325  PO   1 tab





  Q6H PRN   Administration





  Pain, Moderate (4-6)  


 


Quetiapine Fumarate  200 mg  06/08/20 22:00  06/08/20 21:41





  Seroquel  PO   200 mg





  QHS JENNIFER   Administration

## 2020-06-09 NOTE — DISCHARGE SUMMARY
Providers





- Providers


Date of Admission: 


06/08/20 01:13





Date of discharge: 06/09/20


Attending physician: 


AMY J KOCHERLA





                                        





06/08/20 01:00


Consult to Dietitian/Nutrition [CONS] Routine 


   Physician Instructions: 


   Reason For Exam: 


   Reason for Consult: Diet education


Consult to Physician [CONS] Routine 


   Comment: 


   Consulting Provider: LUCIE NOVA


   Physician Instructions: 


   Reason For Exam: DRUG OVERDOSE





06/08/20 16:11


psychiatry consult [Consult to Mental Health] [CONS] Routine 


   Reason For Exam: Accidental drug overdose/depression











Primary care physician: 


PRIMARY CARE MD








Hospitalization


Condition: Stable


Disposition: DC-01 TO HOME OR SELFCARE


Time spent for discharge: 32 min





Core Measure Documentation





- Palliative Care


Palliative Care/ Comfort Measures: Not Applicable





- Core Measures


Any of the following diagnoses?: none





Exam





- Constitutional


Vitals: 


                                        











Temp Pulse Resp BP Pulse Ox


 


 98.0 F   78   18   133/73   94 


 


 06/09/20 05:20  06/09/20 05:28  06/09/20 05:20  06/09/20 05:28  06/09/20 05:20











General appearance: Present: no acute distress, well-nourished, obese





- EENT


Eyes: Present: PERRL, EOM intact





- Neck


Neck: Present: supple, normal ROM





- Respiratory


Respiratory effort: normal


Respiratory: bilateral: diminished, negative: rales, rhonchi, wheezing





- Cardiovascular


Rhythm: regular


Heart Sounds: Present: S1 & S2





- Extremities


Extremities: no ischemia, No edema





- Abdominal


General gastrointestinal: Present: soft, non-tender, non-distended, normal bowel

 sounds





- Integumentary


Integumentary: Present: clear, warm





- Musculoskeletal


Musculoskeletal: strength equal bilaterally





- Psychiatric


Psychiatric: appropriate mood/affect, cooperative





- Neurologic


Neurologic: moves all extremities





Plan


Activity: no restrictions


Diet: regular


Additional Instructions: Advised to see private psychiatrist within 1 week.  

Smoking cessation, nicotine patch as needed


Follow up with: 


PRIMARY CARE,MD [Primary Care Provider] - 3-5 Days


Prescriptions: 


Clindamycin [Clindamycin CAP] 300 mg PO Q6H #40 capsule


Nicotine [Habitrol] 21 mg TD QDAY@2200 #30 patch


clonazePAM [ Klonopin] 0.5 mg PO BID PRN #60 tab


 PRN Reason: Anxiety


Neomycin/Bacitracin/Polymyxinb [Triple Antibiotic Ointment] 9 gm TP BID #1 

oint...g.

## 2020-06-09 NOTE — CONSULTATION
History of Present Illness





- Reason for Consult


Consult date: 06/09/20


Reason for consult: accidental overdaose





- Chief Complaint


Chief complaint: 





Accidental overdose





- History of Present Psychiatric Illness


Jeanette Barrera is a 54y/o female patient who was admitted into the hospital for

an accidental overdose. During my interview with the patient this morning, she 

is a/o x 3. She makes good eye contact. She is verbalizes being upset. She says,

"I'm not doing well. I haven't been treated right in this hospital." She says, "

I have to beg for nicotine patch, socks even ice water." She says, "nobody is 

listening to me. I'm not suicidal." The patient goes on to say, "I had surgery 

on my mouth, and had several teeth pulled. I suffered for days. She says I go 

back to him and he gives me Tylenol 3 and antibiotics." She then says "I'm also 

on Belbuca for pain, I have shoulder pain, and other problems going on with my 

health. Tylenol 3 didn't help, and one Belbuca didn't help, so I took two." The 

patient becomes tearful. She says, "I wasn't trying to kill myself." She says, 

"I'm depressed about everything going on with my health, but I don't want to 

hurt myself." She denies any suicide attempt in the past. She says she sees a 

psychiatrist for "anxiety and bipolar." The patient says she takes "seroquel and

klonopin." She says "I've been on Klonopin 1mg twice a day since 2006 and they 

haven't been giving it to me." She denies any illicit drug use, or alcohol use. 

She says she "smokes a pack of cigarets a day." The patient denies 

hallucinations of any kind. 





PAST PSYCHIATRIC HISTORY:


Diagnoses: "anxiety and bipolar"


Suicide attempts or Self-harm behavior: Denies


Prior psychiatric hospitalizations: Denies


Substance Abuse history: nicotine


Previous psychiatric medications tried: Seroquel and klonopin


Outpatient treatment: Yes





PAST MEDICAL HISTORY: Shoulder pain





Family Psychiatric History: None reported or documented





SOCIAL HISTORY


Marital Status: 


Living Arrangements: Significant other


Employment Status: Disabled


Access to guns/weapons: Denies


Education: 10th grade


History of abuse: Denies


Legal History: Denies





ROS


Constitutional: Negative for weight loss


EMT: Negative for stridor


Respiratory: Negative for cough or hemoptysis


All other systems reviewed and are negative





MENTAL STATUS EXAMINATION


General Appearance: Dressed appropriately.


Behavior: Upset, cooperative. Polite


Mood: "Depressed"


Affect: Congruent with stated mood


Speech: Regular tone and pace


Thought Process: Goal oriented


Suicidal Ideation: Denies


Homicidal Ideation: Denies


Hallucinations: Denies


Delusions: None elicited


Insight and Judgment: Limited


Memory/Cognition: Good





 Assessment and Plan 


Major Depressive Disorder


Generalized Anxiety Disorder





RECOMMENDATIONS


MEDICATIONS


   Klonopin 0.5mg po BID


Risks, benefits and alternatives of medications discussed with the patient, 

questions answered and consent obtained from patient.


PSYCHOTHERAPY: Supportive psychotherapy provided


MEDICAL: Per primary team


DELIRIUM PRECAUTIONS: Please re-orient patient frequently, keep lights on during

the day, and minimize benzodiazepines and opiates as these medications could 

worsen patient's confusion.


SAFETY SITTER: Per medical team


DISPOSITION: The patient does not meet the requirement for acute inpatient 

psychiatric treatment. She may discharge home once medically clear.


The patient is to follow up with outpatient psych or primary in 7 to 14 days.  


Will sign off.  Thank you for the consult.  Please contact with any questions 

and/or concerns.








Medications and Allergies


                                    Allergies











Allergy/AdvReac Type Severity Reaction Status Date / Time


 


ketorolac tromethamine Allergy  Rash Verified 05/28/15 14:53





[From Toradol]     


 


levofloxacin [From Levaquin] Allergy  Rash Verified 05/28/15 14:53











                                Home Medications











 Medication  Instructions  Recorded  Confirmed  Last Taken  Type


 


Buprenorphine HCl [Belbuca] 600 mcg BC Q12H 06/07/20 06/07/20 Unknown History


 


DULoxetine [Cymbalta] 30 mg PO DAILY 06/07/20 06/07/20 Unknown History


 


Dicyclomine [Bentyl] 20 mg PO QHS 06/07/20 06/07/20 Unknown History


 


Penicillin Vk [Veetids TAB] 500 mg PO QID 06/07/20 06/07/20 Unknown History


 


QUEtiapine [SEROquel] 200 mg PO QHS 06/07/20 06/07/20 Unknown History


 


amLODIPine [Norvasc] 5 mg PO DAILY 06/07/20 06/07/20 Unknown History


 


carBAMazepine [Carbamazepine] 200 mg PO QAM 06/07/20 06/07/20 Unknown History


 


carBAMazepine [Carbamazepine] 400 mg PO QHS 06/07/20 06/07/20 Unknown History


 


clonazePAM [Klonopin] 1 mg PO DAILY 06/07/20 06/07/20 Unknown History


 


clonazePAM [ Klonopin] 0.5 mg PO BID PRN #60 tab 06/09/20  Unknown Rx











Active Meds: 


Active Medications





Amlodipine Besylate (Amlodipine)  5 mg PO DAILY [facility]


   Last Admin: 06/08/20 10:16 Dose:  5 mg


   Documented by: 


Dextrose (D50w (25gm) Syringe)  50 ml IV Q30MIN PRN; Protocol


   PRN Reason: Hypoglycemia


Heparin Sodium (Porcine) (Heparin)  5,000 unit SUB-Q Q8HR [facility]


   Last Admin: 06/09/20 05:29 Dose:  Not Given


   Documented by: 


Hydralazine HCl (Apresoline)  10 mg IV Q6HR PRN


   PRN Reason: Blood Pressure


   Last Admin: 06/08/20 17:10 Dose:  10 mg


   Documented by: 


Hydralazine HCl (Apresoline)  25 mg PO Q8HR [facility]


   Last Admin: 06/09/20 05:28 Dose:  25 mg


   Documented by: 


Nicotine (Habitrol)  21 mg TD QDAY@2200 [facility]


   Last Admin: 06/08/20 22:03 Dose:  21 mg


   Documented by: 


Oxycodone/Acetaminophen (Percocet 5/325)  1 tab PO Q6H PRN


   PRN Reason: Pain, Moderate (4-6)


   Last Admin: 06/09/20 05:28 Dose:  1 tab


   Documented by: 


Quetiapine Fumarate (Seroquel)  200 mg PO QHS [facility]


   Last Admin: 06/08/20 21:41 Dose:  200 mg


   Documented by: 











Mental Status Exam





- Vital signs


                                Last Vital Signs











Temp  98.0 F   06/09/20 05:20


 


Pulse  78   06/09/20 05:28


 


Resp  18   06/09/20 05:20


 


BP  133/73   06/09/20 05:28


 


Pulse Ox  94   06/09/20 05:20














Results


Result Diagrams: 


                                 06/09/20 05:00





                                 06/09/20 05:00


                              Abnormal lab results











  06/08/20 06/08/20 06/09/20 Range/Units





  11:59 16:16 05:00 


 


RBC    3.49 L  (3.65-5.03)  M/mm3


 


Lymph % (Auto)    35.5 H  (13.4-35.0)  %


 


Mono % (Auto)    8.0 H  (0.0-7.3)  %


 


Potassium     (3.6-5.0)  mmol/L


 


Creatinine     (0.7-1.2)  mg/dL


 


Glucose     ()  mg/dL


 


POC Glucose  106 H  121 H   ()  


 


Calcium     (8.4-10.2)  mg/dL














  06/09/20 Range/Units





  05:00 


 


RBC   (3.65-5.03)  M/mm3


 


Lymph % (Auto)   (13.4-35.0)  %


 


Mono % (Auto)   (0.0-7.3)  %


 


Potassium  3.1 L  (3.6-5.0)  mmol/L


 


Creatinine  0.6 L  (0.7-1.2)  mg/dL


 


Glucose  104 H  ()  mg/dL


 


POC Glucose   ()  


 


Calcium  8.2 L  (8.4-10.2)  mg/dL








All other labs normal.